# Patient Record
Sex: FEMALE
[De-identification: names, ages, dates, MRNs, and addresses within clinical notes are randomized per-mention and may not be internally consistent; named-entity substitution may affect disease eponyms.]

---

## 2022-03-20 ENCOUNTER — NURSE TRIAGE (OUTPATIENT)
Dept: OTHER | Facility: CLINIC | Age: 57
End: 2022-03-20

## 2022-03-20 NOTE — TELEPHONE ENCOUNTER
Subjective: Caller states found out Friday that I have a blood clot in upper right calf in right peritoneal vein\"     Current Symptoms: chest tight and heavy, cough, mucous, pulse 58    Onset: 3 days ago; worsening    Associated Symptoms:      Pain Severity: denies    Temperature: denies    What has been tried:  Romel Ghosh, resting    LMP: NA Pregnant: NA    Recommended disposition: Go to ED Now for futher evaluation of chest tightness. Care advice provided, patient verbalizes understanding; denies any other questions or concerns; instructed to call back for any new or worsening symptoms. Patient/caller agrees to proceed to [facility] Emergency Department    This triage is a result of a call to [address] [place] [address]. Please do not respond to the triage nurse through this encounter. Any subsequent communication should be directly with the patient.         Reason for Disposition   Dizziness or lightheadedness    Protocols used: CHEST PAIN-ADULT-AH